# Patient Record
Sex: MALE | Race: WHITE | Employment: UNEMPLOYED | URBAN - METROPOLITAN AREA
[De-identification: names, ages, dates, MRNs, and addresses within clinical notes are randomized per-mention and may not be internally consistent; named-entity substitution may affect disease eponyms.]

---

## 2018-11-20 ENCOUNTER — HOSPITAL ENCOUNTER (OUTPATIENT)
Dept: MRI IMAGING | Age: 33
Discharge: HOME | End: 2018-11-20
Payer: MEDICAID

## 2018-11-20 DIAGNOSIS — M62.830 BACK MUSCLE SPASM: ICD-10-CM

## 2018-11-20 DIAGNOSIS — M99.01 CERVICAL SEGMENT DYSFUNCTION: ICD-10-CM

## 2018-11-20 DIAGNOSIS — M54.2 CERVICALGIA: ICD-10-CM

## 2018-11-20 PROCEDURE — 72141 MRI NECK SPINE W/O DYE: CPT

## 2021-02-28 ENCOUNTER — HOSPITAL ENCOUNTER (EMERGENCY)
Age: 36
Discharge: HOME | End: 2021-02-28
Attending: STUDENT IN AN ORGANIZED HEALTH CARE EDUCATION/TRAINING PROGRAM
Payer: MEDICAID

## 2021-02-28 VITALS
WEIGHT: 185 LBS | DIASTOLIC BLOOD PRESSURE: 62 MMHG | HEART RATE: 97 BPM | BODY MASS INDEX: 26.48 KG/M2 | TEMPERATURE: 99 F | OXYGEN SATURATION: 97 % | RESPIRATION RATE: 14 BRPM | HEIGHT: 70 IN | SYSTOLIC BLOOD PRESSURE: 95 MMHG

## 2021-02-28 DIAGNOSIS — R46.89 AGGRESSIVE BEHAVIOR: Primary | ICD-10-CM

## 2021-02-28 DIAGNOSIS — R46.89 VERBALLY ABUSIVE BEHAVIOR: ICD-10-CM

## 2021-02-28 LAB
A/G RATIO: 1.5 (ref 1.1–2.2)
ACETAMINOPHEN LEVEL: <5 UG/ML (ref 10–30)
ALBUMIN SERPL-MCNC: 4.3 G/DL (ref 3.4–5)
ALP BLD-CCNC: 139 U/L (ref 40–129)
ALT SERPL-CCNC: 176 U/L (ref 10–40)
AMPHETAMINE SCREEN, URINE: ABNORMAL
ANION GAP SERPL CALCULATED.3IONS-SCNC: 15 MMOL/L (ref 3–16)
AST SERPL-CCNC: 122 U/L (ref 15–37)
BARBITURATE SCREEN URINE: ABNORMAL
BASOPHILS ABSOLUTE: 0 K/UL (ref 0–0.2)
BASOPHILS RELATIVE PERCENT: 0.5 %
BENZODIAZEPINE SCREEN, URINE: ABNORMAL
BILIRUB SERPL-MCNC: 0.7 MG/DL (ref 0–1)
BILIRUBIN URINE: NEGATIVE
BLOOD, URINE: NEGATIVE
BUN BLDV-MCNC: 3 MG/DL (ref 7–20)
CALCIUM SERPL-MCNC: 9.3 MG/DL (ref 8.3–10.6)
CANNABINOID SCREEN URINE: POSITIVE
CHLORIDE BLD-SCNC: 101 MMOL/L (ref 99–110)
CLARITY: CLEAR
CO2: 23 MMOL/L (ref 21–32)
COCAINE METABOLITE SCREEN URINE: ABNORMAL
COLOR: YELLOW
CREAT SERPL-MCNC: 0.7 MG/DL (ref 0.9–1.3)
EKG ATRIAL RATE: 82 BPM
EKG DIAGNOSIS: NORMAL
EKG P AXIS: 57 DEGREES
EKG P-R INTERVAL: 160 MS
EKG Q-T INTERVAL: 414 MS
EKG QRS DURATION: 74 MS
EKG QTC CALCULATION (BAZETT): 483 MS
EKG R AXIS: 53 DEGREES
EKG T AXIS: 70 DEGREES
EKG VENTRICULAR RATE: 82 BPM
EOSINOPHILS ABSOLUTE: 0.1 K/UL (ref 0–0.6)
EOSINOPHILS RELATIVE PERCENT: 1.2 %
ETHANOL: 145 MG/DL (ref 0–0.08)
ETHANOL: 45 MG/DL (ref 0–0.08)
ETHANOL: 93 MG/DL (ref 0–0.08)
GFR AFRICAN AMERICAN: >60
GFR NON-AFRICAN AMERICAN: >60
GLOBULIN: 2.8 G/DL
GLUCOSE BLD-MCNC: 102 MG/DL (ref 70–99)
GLUCOSE URINE: NEGATIVE MG/DL
HCT VFR BLD CALC: 44.7 % (ref 40.5–52.5)
HEMOGLOBIN: 15.3 G/DL (ref 13.5–17.5)
KETONES, URINE: NEGATIVE MG/DL
LEUKOCYTE ESTERASE, URINE: NEGATIVE
LYMPHOCYTES ABSOLUTE: 1.7 K/UL (ref 1–5.1)
LYMPHOCYTES RELATIVE PERCENT: 29.5 %
Lab: ABNORMAL
MCH RBC QN AUTO: 33.9 PG (ref 26–34)
MCHC RBC AUTO-ENTMCNC: 34.3 G/DL (ref 31–36)
MCV RBC AUTO: 98.7 FL (ref 80–100)
METHADONE SCREEN, URINE: ABNORMAL
MICROSCOPIC EXAMINATION: NORMAL
MONOCYTES ABSOLUTE: 0.6 K/UL (ref 0–1.3)
MONOCYTES RELATIVE PERCENT: 10 %
NEUTROPHILS ABSOLUTE: 3.4 K/UL (ref 1.7–7.7)
NEUTROPHILS RELATIVE PERCENT: 58.8 %
NITRITE, URINE: NEGATIVE
OPIATE SCREEN URINE: ABNORMAL
OXYCODONE URINE: ABNORMAL
PDW BLD-RTO: 14.4 % (ref 12.4–15.4)
PH UA: 6
PH UA: 6 (ref 5–8)
PHENCYCLIDINE SCREEN URINE: ABNORMAL
PLATELET # BLD: 212 K/UL (ref 135–450)
PMV BLD AUTO: 9.6 FL (ref 5–10.5)
POTASSIUM REFLEX MAGNESIUM: 3.6 MMOL/L (ref 3.5–5.1)
PROPOXYPHENE SCREEN: ABNORMAL
PROTEIN UA: NEGATIVE MG/DL
RBC # BLD: 4.52 M/UL (ref 4.2–5.9)
SALICYLATE, SERUM: <0.3 MG/DL (ref 15–30)
SODIUM BLD-SCNC: 139 MMOL/L (ref 136–145)
SPECIFIC GRAVITY UA: <=1.005 (ref 1–1.03)
TOTAL PROTEIN: 7.1 G/DL (ref 6.4–8.2)
TROPONIN: <0.01 NG/ML
URINE TYPE: NORMAL
UROBILINOGEN, URINE: 0.2 E.U./DL
WBC # BLD: 5.7 K/UL (ref 4–11)

## 2021-02-28 PROCEDURE — 93005 ELECTROCARDIOGRAM TRACING: CPT | Performed by: STUDENT IN AN ORGANIZED HEALTH CARE EDUCATION/TRAINING PROGRAM

## 2021-02-28 PROCEDURE — 99284 EMERGENCY DEPT VISIT MOD MDM: CPT

## 2021-02-28 PROCEDURE — 81003 URINALYSIS AUTO W/O SCOPE: CPT

## 2021-02-28 PROCEDURE — 80307 DRUG TEST PRSMV CHEM ANLYZR: CPT

## 2021-02-28 PROCEDURE — 96372 THER/PROPH/DIAG INJ SC/IM: CPT

## 2021-02-28 PROCEDURE — 85025 COMPLETE CBC W/AUTO DIFF WBC: CPT

## 2021-02-28 PROCEDURE — 80053 COMPREHEN METABOLIC PANEL: CPT

## 2021-02-28 PROCEDURE — 6370000000 HC RX 637 (ALT 250 FOR IP): Performed by: STUDENT IN AN ORGANIZED HEALTH CARE EDUCATION/TRAINING PROGRAM

## 2021-02-28 PROCEDURE — 80143 DRUG ASSAY ACETAMINOPHEN: CPT

## 2021-02-28 PROCEDURE — 93010 ELECTROCARDIOGRAM REPORT: CPT | Performed by: INTERNAL MEDICINE

## 2021-02-28 PROCEDURE — 2580000003 HC RX 258: Performed by: STUDENT IN AN ORGANIZED HEALTH CARE EDUCATION/TRAINING PROGRAM

## 2021-02-28 PROCEDURE — 82077 ASSAY SPEC XCP UR&BREATH IA: CPT

## 2021-02-28 PROCEDURE — 80179 DRUG ASSAY SALICYLATE: CPT

## 2021-02-28 PROCEDURE — 84484 ASSAY OF TROPONIN QUANT: CPT

## 2021-02-28 PROCEDURE — 6360000002 HC RX W HCPCS: Performed by: STUDENT IN AN ORGANIZED HEALTH CARE EDUCATION/TRAINING PROGRAM

## 2021-02-28 RX ORDER — DIPHENHYDRAMINE HYDROCHLORIDE 50 MG/ML
50 INJECTION INTRAMUSCULAR; INTRAVENOUS ONCE
Status: COMPLETED | OUTPATIENT
Start: 2021-02-28 | End: 2021-02-28

## 2021-02-28 RX ORDER — LORAZEPAM 2 MG/ML
2 INJECTION INTRAMUSCULAR ONCE
Status: COMPLETED | OUTPATIENT
Start: 2021-02-28 | End: 2021-02-28

## 2021-02-28 RX ORDER — 0.9 % SODIUM CHLORIDE 0.9 %
2000 INTRAVENOUS SOLUTION INTRAVENOUS ONCE
Status: COMPLETED | OUTPATIENT
Start: 2021-02-28 | End: 2021-02-28

## 2021-02-28 RX ORDER — HALOPERIDOL 5 MG/ML
5 INJECTION INTRAMUSCULAR ONCE
Status: COMPLETED | OUTPATIENT
Start: 2021-02-28 | End: 2021-02-28

## 2021-02-28 RX ORDER — OLANZAPINE 5 MG/1
10 TABLET, ORALLY DISINTEGRATING ORAL ONCE
Status: COMPLETED | OUTPATIENT
Start: 2021-02-28 | End: 2021-02-28

## 2021-02-28 RX ADMIN — LORAZEPAM 2 MG: 2 INJECTION INTRAMUSCULAR; INTRAVENOUS at 03:33

## 2021-02-28 RX ADMIN — OLANZAPINE 10 MG: 5 TABLET, ORALLY DISINTEGRATING ORAL at 02:56

## 2021-02-28 RX ADMIN — DIPHENHYDRAMINE HYDROCHLORIDE 50 MG: 50 INJECTION, SOLUTION INTRAMUSCULAR; INTRAVENOUS at 03:25

## 2021-02-28 RX ADMIN — SODIUM CHLORIDE 2000 ML: 9 INJECTION, SOLUTION INTRAVENOUS at 05:02

## 2021-02-28 RX ADMIN — HALOPERIDOL 5 MG: 5 INJECTION INTRAMUSCULAR at 03:33

## 2021-02-28 ASSESSMENT — PAIN SCALES - GENERAL: PAINLEVEL_OUTOF10: 10

## 2021-02-28 NOTE — ED NOTES
Presenting Problem: Agitation and acute alcohol intoxication    Appearance/Hygiene:  hospital attire, lying in bed, fair grooming and fair hygiene   Motor Behavior: WNL   Attitude: uncooperative  Affect: agitation   Speech: loud  Mood: angry   Thought Processes: Goal directed  Perceptions: Absent   Thought content: future oriented  Suicidal ideation:  no specific plan to harm self   Homicidal ideation:  none  Orientation: A&Ox4   Memory: intact  Concentration: Fair    Insight/ judgement: impaired judgment      Psychosocial and contextual factors: Pt is currently living in his father's apartment complex and states he will be evicted soon. He states his father is evicting him due to verbal threats. He denies ever making verbal threats to his father and reports he is unsure of where he will go. Pt states he was helping his father produce an upcoming moving called, \"Chance,\" about a 13 y/o boy in [place] that committed suicide. Pt states he viewed this boy as his brother and states he was very close with the family. Pt states the movie comes out on 04/21 and he is upset about the content in the movie because he feels his father did not represent the facts. C-SSRS Summary (including current and past suicidal ideation, plan, intent, and attempts) : Pt denies all hx of suicide attempts. He denies current SI, plan, and intent. Patient reported diagnosis: Bipolar Disorder    Outpatient services/ Provider: [facility] with provider, Enrico Hilliard, and upcoming appointment on 3/3/21    Previous Inpatient Admissions( including location and dates if known): Reports hospitalization in 2016 in [place], [place].     Self-injurious/ Self-harm behavior: Denies    History of violence: Aggressive behavior while intoxicated last night    Current Substance use: Pt reports occasional alcohol use and states he drank 3-4 beers last night    Trauma identified: Denies    Access to Firearms: Denies    ASSESSMENT FOR IMMINENT FUTURE DANGER:      RISK FACTORS:    []  Age <25 or >49   [x]  Male gender   []  Depressed mood   []  Active suicidal ideation   []  Suicide plan   []  Suicide attempt   []  Access to lethal means   []  Prior suicide attempt   [x]  Active substance abuse   []  Highly impulsive behaviors   []  Not attending to self-care/ADLs    []  Recent significant loss   []  Chronic pain or medical illness   []  Social isolation   []  History of violence   []  Active psychosis   []  Cognitive impairment    []  No outpatient services in place   []  Medication noncompliance   [x]  No collateral information to support safety    PROTECTIVE FACTORS:  [x] Age >25 and <55  [] Female gender   [x] Denies depression  [x] Denies suicidal ideation  [x] Does not have lethal plan   [x] Does not have access to guns or weapons  [x] Patient is verbally yamil for safety  [x] No prior suicide attempts  [] No active substance abuse  [] Patient has social or family support  [x] No active psychosis or cognitive dysfunction  [x] Physically healthy  [x] Has outpatient services in place  [] Compliant with recommended medications  [] Collateral information from. ..supports patient safety   [x] Patient is future oriented with plans to attend his outpt tx appointment on 3/3/21    Clinical Summary:    Patient presents to [facility] AN [facility]IL[facility] on a SOB after he was brought in by police. Patient was clinically sober at the time of the evaluation. Patient was evaluated and offered supportive counseling. SOB states, \"Stripped Himself in front of officers, displayed aggressive bahaviors. Claiming that murderers are after, him, sending him messages. \" Upon arrival to ED pt was uncooperative, angry, demanding to leave, and verbally aggressive. He was intoxicated with ethanol level at 145. At some point he was medicated for safety and he slept overnight in ED. Writer woke pt up to participate in assessment.  Pt was initially unwilling to participate in assessment and would not remove blanket from his face. Eventually he sat up and started screaming at [facility] and ED staff to get him a warm blanket. Pt was redirected several times and then was agreeable to participate in the assessment. Pt states he called police last night while intoxicated. He states he called the police after an argument with his family. He states he is most upset with his family because his father is not producing the facts in his upcoming movie. He states he does not remember telling police that murderers were after him. Pt denies any issues with alcohol and states he only drinks on occasion. Pt denies that he is hearing voices or having any visual hallucinations. He has been uncooperative and uses profane often with writer but is able to be redirected. He is alert and oriented x4, his thoughts are linear, and he is answering all questions appropriately. He continues to deny all AVH, he does not present with any paranoid thoughts, and he does not appear to be RTIS. He was not suicidal nor homicidal and he is currently clinically sober and denying all SI, plan, and intent along with any HI, plan, and intent.            [address] [place] [address], [address]  02/28/21 1300

## 2021-02-28 NOTE — ED NOTES
Talked to patient dad Martina Rutledge [phone]), states he left 15 threats on his phone last night. He said he has been talking to dead, ANA LILIA, and thinks he is solving a murder. Was in the [facility] in [facility] for 4mths, and stopped his meds.      Brenda Victoria RN  02/28/21 5798
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
## 2021-02-28 NOTE — DISCHARGE INSTR - COC
(Last 24 hours) at 2021 1230  Last data filed at 2021 0701  Gross per 24 hour   Intake 2000 ml   Output --   Net 2000 ml     No intake/output data recorded.     Safety Concerns:     [address] WILLIE Safety Concerns:223176825}    Impairments/Disabilities:      [address] Ray[address] [facility] Impairments/Disabilities:406470103}    Nutrition Therapy:  Current Nutrition Therapy:   [address] [facility] Diet List:989147495}    Routes of Feeding: {CHP DME Other Feedings:003198156}  Liquids: {Slp liquid thickness:17343}  Daily Fluid Restriction: {CHP DME Yes amt example:274367268}  Last Modified Barium Swallow with Video (Video Swallowing Test): {Done Not Done TTME:065163296}    Treatments at the Time of Hospital Discharge:   Respiratory Treatments: ***  Oxygen Therapy:  {Therapy; copd oxygen:99248}  Ventilator:    { CC Vent RBCA:626004345}    Rehab Therapies: {THERAPEUTIC INTERVENTION:1725644555}  Weight Bearing Status/Restrictions: [address]  Weight Bearin}  Other Medical Equipment (for information only, NOT a DME order):  {EQUIPMENT:517247631}  Other Treatments: ***    Patient's personal belongings (please select all that are sent with patient):  {[facility] DME Belongings:926589367}    RN SIGNATURE:  {Esignature:304981606}    CASE MANAGEMENT/SOCIAL WORK SECTION    Inpatient Status Date: ***    Readmission Risk Assessment Score:  Readmission Risk              Risk of Unplanned Readmission:        0           Discharging to Facility/ Agency   · Name:   · Address:  · Phone:  · Fax:    Dialysis Facility (if applicable)   · Name:  · Address:  · Dialysis Schedule:  · Phone:  · Fax:    / signature: {Esignature:571280757}    PHYSICIAN SECTION    Prognosis: {Prognosis:8521962052}    Condition at Discharge: [address]za Patient Condition:010306063}    Rehab Potential (if transferring to Rehab): {Prognosis:9416425692}    Recommended Labs or Other Treatments After Discharge: ***    Physician Certification: I certify the above information and transfer of Alberta Ace  is necessary for the continuing treatment of the diagnosis listed and that he requires {Admit to Appropriate Level of Care:48205} for {GREATER/LESS:385572044} 30 days.      Update Admission H&P: {CHP DME Changes in IRAMQ:967830161}    PHYSICIAN SIGNATURE:  {Esignature:893633262}

## 2021-02-28 NOTE — ED NOTES
Level of Care Disposition: Discharge    Patient was seen by ED provider and [facility] AN [facility]FFILIATE OF [facility] staff. The case was presented to psychiatric provider on-call Dr. Gray Nguyen. Based on the ED evaluation and information presented to the provider by this writer the decision was made to discharge patient with the following referrals: SA tx resources and [facility]      RATIONALE FOR NON-ADMISSION:  The patient does not meet criteria for an involuntary psychiatric admission because pt does not present as an imminent risk to himself or another person. He is alert and oriented x4, his thoughts are linear, and he is answering all questions appropriately. He continues to deny all AVH, he does not present with any paranoid thoughts, and he does not appear to be RTIS. He was not suicidal nor homicidal and he is currently clinically sober and denying all SI, plan, and intent along with any HI, plan, and intent. He is future oriented at this time with plans to attend his outpt tx appointment at [address] [place] [address] on 3/3/21.          [address] [place] [address]  02/28/21 [facility]3

## 2021-02-28 NOTE — ED NOTES
Lunch tray provided. Pt took covers off his head when verbally addressed by writer but did not open his eyes or acknowledge my presence otherwise.       Zaina Verde RN  02/28/21 7589
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
## 2021-02-28 NOTE — ED NOTES
Pt very loud and yelling out he wants to leave. Pt has been very verbal, yelling at the top of his voice. He doesn't want to be here even though he called the police. Pt will not comply with any of us asking for him to calm and quit yelling out of room. Offered to give pt more medication for him to calm down and he is refusing. Called for a show of force to gain pt cooperation. Pt complied the IM meds with show of force.       Nasir Quinn RN  02/28/21 9185